# Patient Record
Sex: MALE | Race: WHITE | NOT HISPANIC OR LATINO | Employment: PART TIME | URBAN - METROPOLITAN AREA
[De-identification: names, ages, dates, MRNs, and addresses within clinical notes are randomized per-mention and may not be internally consistent; named-entity substitution may affect disease eponyms.]

---

## 2018-12-07 ENCOUNTER — OFFICE VISIT (OUTPATIENT)
Dept: FAMILY MEDICINE CLINIC | Facility: CLINIC | Age: 23
End: 2018-12-07
Payer: COMMERCIAL

## 2018-12-07 VITALS
BODY MASS INDEX: 19.77 KG/M2 | DIASTOLIC BLOOD PRESSURE: 72 MMHG | HEIGHT: 72 IN | WEIGHT: 146 LBS | TEMPERATURE: 98.8 F | OXYGEN SATURATION: 98 % | SYSTOLIC BLOOD PRESSURE: 110 MMHG

## 2018-12-07 DIAGNOSIS — Z23 NEED FOR HPV VACCINATION: ICD-10-CM

## 2018-12-07 DIAGNOSIS — Z23 NEED FOR IMMUNIZATION AGAINST INFLUENZA: ICD-10-CM

## 2018-12-07 DIAGNOSIS — Z00.00 WELL ADULT EXAM: Primary | ICD-10-CM

## 2018-12-07 PROCEDURE — 90471 IMMUNIZATION ADMIN: CPT | Performed by: FAMILY MEDICINE

## 2018-12-07 PROCEDURE — 90472 IMMUNIZATION ADMIN EACH ADD: CPT | Performed by: FAMILY MEDICINE

## 2018-12-07 PROCEDURE — 90651 9VHPV VACCINE 2/3 DOSE IM: CPT | Performed by: FAMILY MEDICINE

## 2018-12-07 PROCEDURE — 99213 OFFICE O/P EST LOW 20 MIN: CPT | Performed by: FAMILY MEDICINE

## 2018-12-07 PROCEDURE — 90686 IIV4 VACC NO PRSV 0.5 ML IM: CPT | Performed by: FAMILY MEDICINE

## 2018-12-07 RX ORDER — FLUTICASONE PROPIONATE 50 MCG
1 SPRAY, SUSPENSION (ML) NASAL 2 TIMES DAILY
COMMUNITY
Start: 2013-12-03

## 2018-12-07 RX ORDER — CLINDAMYCIN PHOSPHATE 10 MG/G
GEL TOPICAL 2 TIMES DAILY
COMMUNITY
Start: 2013-12-03

## 2018-12-07 RX ORDER — TRETINOIN 0.5 MG/G
CREAM TOPICAL DAILY
COMMUNITY
Start: 2013-12-03

## 2018-12-07 NOTE — PROGRESS NOTES
ASSESSMENT & PLAN    Diagnoses and all orders for this visit:    Well adult exam  -     Lipid Panel with Direct LDL reflex; Future  -     Comprehensive metabolic panel; Future  -     Lipid Panel with Direct LDL reflex  -     Comprehensive metabolic paneL    FLU shot  administered today  **PATIENT RECEIVED #2 DOSE OF HPV TODAY, HE IS TO RECEIVE 3RD DOSE AT NEXT VISIT  Assessment/Plan     Counseled on lifestyle modifications related to diet and exercise to include, but not limited to: Increased consumption of fruits & vegetables, decreased consumption of processed foods (fast food, junk food, soda), increased daily water intake, goal physical activity of 3 times weekly at least 30 minutes in duration and at a minimum of moderate intensity  Alonso Canseco acknowledged understanding and agreement with the treatment plan  No Follow-up on file  SUBJECTIVE    HPI:    20 y/o M with no significant past medical history presents today for a general physical exam   He currently states that he has had a cough for the past week, and has been having sputum for the past couple days but he is not too concerned with it  He states his mood has been good lately       Visit Type: Health Maintenance  Last Health Maintenance Visit: 3 years ago    Social History   Marital Status: single   Household Members: with roommates    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   Tobacco Use:     NO   Alcohol Use:    SOCIAL   Drug Use: NO    General Reported Health:    Dental: Brushes teeth 1 time per day, flosses 0 time(s) per day, last dental  visit approximately many years ago   Vision: Last eye exam approximately 1 month ago   Hearing Loss: No   Immunizations:      Lifestyle     Diet:    Fruits & Vegetables 3 time(s) a day    Protein 3 time(s) a day    Water intake of 32 oz per day    Soda Intake: limited    Fast Food: occasional    Junk Food: rare   Exercise:    Weekly Exercise: 2 time(s) per week    Duration: 30 minutes at a time    Type:  Walking    Intensity: moderate    Reproductive Health   Sexually Active: yes, 1 partner    Contraception: condoms   STD Testing: deferred by patient         Other HPI:    None    Reviewed, and if applicable, updated allergies, medications, past medical history, past surgical history, family history, social history, problem list        Review of Systems   Constitution: Negative for chills, decreased appetite, weakness, malaise/fatigue, night sweats, weight gain and weight loss  HENT: Negative  Cardiovascular: Negative for chest pain, cyanosis and leg swelling  Respiratory: Negative for cough, shortness of breath and wheezing  Musculoskeletal: Negative for arthritis and back pain  Gastrointestinal: Negative  Genitourinary: Negative  Neurological: Negative for excessive daytime sleepiness, dizziness, light-headedness and loss of balance  Psychiatric/Behavioral: Negative for altered mental status and depression  OBJECTIVE    Vitals:    12/07/18 0810   BP: 110/72   Temp: 98 8 °F (37 1 °C)   SpO2: 98%       Physical Exam   Constitutional: He is oriented to person, place, and time  He appears well-developed and well-nourished  No distress  HENT:   Head: Normocephalic and atraumatic  Right Ear: External ear normal    Left Ear: External ear normal    Mouth/Throat: Oropharynx is clear and moist    Cardiovascular: Normal rate, regular rhythm and normal heart sounds  Pulmonary/Chest: Effort normal and breath sounds normal  No respiratory distress  He has no wheezes  Abdominal: Soft  Bowel sounds are normal  He exhibits no distension and no mass  There is no tenderness  Lymphadenopathy:     He has no cervical adenopathy  Neurological: He is alert and oriented to person, place, and time  Skin: He is not diaphoretic